# Patient Record
Sex: MALE | Race: WHITE | Employment: STUDENT | URBAN - METROPOLITAN AREA
[De-identification: names, ages, dates, MRNs, and addresses within clinical notes are randomized per-mention and may not be internally consistent; named-entity substitution may affect disease eponyms.]

---
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
## 2024-04-29 ENCOUNTER — HOSPITAL ENCOUNTER (OUTPATIENT)
Age: 19
Discharge: HOME | End: 2024-04-29
Payer: COMMERCIAL

## 2024-04-29 ENCOUNTER — APPOINTMENT (OUTPATIENT)
Dept: GENERAL RADIOLOGY | Age: 19
End: 2024-04-29
Attending: PHYSICIAN ASSISTANT
Payer: COMMERCIAL

## 2024-04-29 VITALS
DIASTOLIC BLOOD PRESSURE: 64 MMHG | HEART RATE: 63 BPM | TEMPERATURE: 98 F | OXYGEN SATURATION: 96 % | RESPIRATION RATE: 18 BRPM | SYSTOLIC BLOOD PRESSURE: 116 MMHG

## 2024-04-29 DIAGNOSIS — S93.401A SPRAIN OF RIGHT ANKLE, UNSPECIFIED LIGAMENT, INITIAL ENCOUNTER: ICD-10-CM

## 2024-04-29 DIAGNOSIS — S99.911A INJURY OF RIGHT ANKLE, INITIAL ENCOUNTER: Primary | ICD-10-CM

## 2024-04-29 PROCEDURE — 99203 OFFICE O/P NEW LOW 30 MIN: CPT | Performed by: PHYSICIAN ASSISTANT

## 2024-04-29 PROCEDURE — 73630 X-RAY EXAM OF FOOT: CPT | Performed by: PHYSICIAN ASSISTANT

## 2024-04-29 PROCEDURE — 73610 X-RAY EXAM OF ANKLE: CPT | Performed by: PHYSICIAN ASSISTANT

## 2024-04-29 RX ORDER — ARM BRACE
EACH MISCELLANEOUS
Qty: 1 EACH | Refills: 0 | Status: SHIPPED | OUTPATIENT
Start: 2024-04-29

## 2024-04-29 NOTE — ED INITIAL ASSESSMENT (HPI)
Pt was   in the basement when a monitor dropped on his rt foot an ankle, and then he nay have re injured it while running track

## 2024-04-29 NOTE — ED PROVIDER NOTES
Patient Seen in: [facility]      History   No chief complaint on file.    Stated Complaint: Right ankle injury    Subjective:   HPI  Khai Mcfarland is 19 year old male presents with acute right foot/ ankle pain x 1 day due to blunt injury.  Patient reports pain localized to dorsum of right foot/ ankle.  Non radiating, worsened w/ ambulation, weight bearing, and palpation.  No numbness, tingling, parasthesias, skin/ color/ temperature/ sensory changes reported.  No medications taken prior to arrival.             Objective:   Past Medical History:    ASD (atrial septal defect) ([facility])    resolved     Wise dischared    Asthma (HCC)    Down's syndrome (HCC)    Ear drum perforation    left   [facility]  at [facility]    Hypothyroid    Garcia   Luries    Pneumothorax    Left side   admit at [facility]     Ped Doreen Bingham              Past Surgical History:   Procedure Laterality Date    Adenoidectomy  2008    Luries    Create eardrum opening,gen anesth      Tonsillectomy                  Social History     Socioeconomic History    Marital status: Single   Tobacco Use    Smoking status: Never    Smokeless tobacco: Never   Vaping Use    Vaping status: Never Used   Substance and Sexual Activity    Alcohol use: No    Drug use: No              Review of Systems   All other systems reviewed and are negative.      Positive for stated complaint: Right ankle injury  Other systems are as noted in HPI.  Constitutional and vital signs reviewed.      All other systems reviewed and negative except as noted above.    Physical Exam     ED Triage Vitals [04/29/24 1115]   /64   Pulse 63   Resp 18   Temp 98.1 °F (36.7 °C)   Temp src Temporal   SpO2 96 %   O2 Device        Current:/64   Pulse 63   Temp 98.1 °F (36.7 °C) (Temporal)   Resp 18   SpO2 96%         Physical Exam  Vitals and nursing note reviewed.   Constitutional:       General: He is not in acute distress.     Appearance: Normal appearance. He is normal  weight. He is not ill-appearing, toxic-appearing or diaphoretic.   HENT:      Head: Normocephalic and atraumatic.      Nose: Nose normal.   Eyes:      Extraocular Movements: Extraocular movements intact.      Pupils: Pupils are equal, round, and reactive to light.   Cardiovascular:      Rate and Rhythm: Normal rate.      Pulses: Normal pulses.   Pulmonary:      Effort: Pulmonary effort is normal.      Breath sounds: Normal breath sounds.   Musculoskeletal:         General: Swelling and tenderness present. No deformity or signs of injury. Normal range of motion.      Cervical back: Normal range of motion and neck supple.      Right lower leg: No edema.      Left lower leg: No edema.      Comments: Soft tissue swelling to dorsum of right foot otherwise capillary refill less than 2 seconds, DP/ PT pulses intact 2+ bilaterally      Skin:     General: Skin is warm and dry.      Capillary Refill: Capillary refill takes less than 2 seconds.      Coloration: Skin is not jaundiced or pale.      Findings: No bruising, erythema, lesion or rash.   Neurological:      General: No focal deficit present.      Mental Status: He is alert and oriented to person, place, and time. Mental status is at baseline.   Psychiatric:         Mood and Affect: Mood normal.         Behavior: Behavior normal.         Thought Content: Thought content normal.         Judgment: Judgment normal.               ED Course   Labs Reviewed - No data to display  XR FOOT, COMPLETE (MIN 3 VIEWS), RIGHT (CPT=73630)   Final Result   PROCEDURE:  XR FOOT, COMPLETE (MIN 3 VIEWS), RIGHT (CPT=73630)       TECHNIQUE:  AP, oblique, and lateral views were obtained.       COMPARISON:  None.       INDICATIONS:  Right foot pain       PATIENT STATED HISTORY: (As transcribed by Technologist)  Right ankle and    Proximal foot pain. Patient accidentally dropped a computer monitor on his    foot on 04/13.        FINDINGS:  No evidence of acute displaced fracture or dislocation.      Developmental foreshortening of the 4th metatarsal noted.  Mild soft    tissue swelling noted along the ankle.  Normal mineralization.                           =====   CONCLUSION:  No evidence of acute displaced fracture or dislocation in the    right foot.       LOCATION:  [id number]           Dictated by (CST): Darryl Mckeon MD on 4/29/2024 at 11:52 AM        Finalized by (CST): Darryl Mckeon MD on 4/29/2024 at 11:53 AM         XR ANKLE (MIN 3 VIEWS), RIGHT (CPT=73610)   Final Result   PROCEDURE:  XR ANKLE (MIN 3 VIEWS), RIGHT (CPT=73610)       TECHNIQUE:  Three views were obtained.       COMPARISON:  [facility], XR, XR FOOT, COMPLETE (MIN 3 VIEWS), RIGHT    (CPT=73630), 4/29/2024, 11:38 AM.       INDICATIONS:  Right ankle pain       PATIENT STATED HISTORY: (As transcribed by Technologist)  Right ankle and    proximal foot pain. Patient accidentally dropped a computer monitor on his    foot on 04/13.        FINDINGS:  No evidence of acute displaced fracture or dislocation.  Normal    mineralization.  Mild to moderate anterolateral soft tissue swelling.     Ankle mortise intact.                         =====   CONCLUSION:  No evidence of acute displaced fracture or dislocation in the    right ankle.  Soft tissue swelling.       LOCATION:  [id number]           Dictated by (CST): Darryl Mckeon MD on 4/29/2024 at 11:51 AM        Finalized by (CST): Darryl Mckeon MD on 4/29/2024 at 11:52 AM           Vitals:    04/29/24 1115   BP: 116/64   Pulse: 63   Resp: 18   Temp: 98.1 °F (36.7 °C)     Medications - No data to display                   MDM                                        Medical Decision Making  19 year old male presents with right lower extremity sprain due to blunt injury sustained over 2 weeks prior with aggravation less than 1 week ago while running track.  Considerations include fracture vs dislocation vs achilles tendon rupture .    Plan  - x ray: right foot/ right ankle  - rx: CAM walking  boot/ Crutches  - OTC: Tylenol 1g po q 6 hours/ prn. ibuprofen 600mg po q8 hours/ prn.   - RICE Apply a compressive ACE bandage. Rest and elevate the affected painful area. Apply cold compresses intermittently as needed. As pain recedes, begin normal activities slowly as tolerated. Call if symptoms persist.  - explained to patient that if symptoms do not improve that an MRI may be warranted however that will be determined at the discretion of follow up care  - refer to orthopaedics/ PCP  as needed     Amount and/or Complexity of Data Reviewed  Radiology: ordered and independent interpretation performed. Decision-making details documented in ED Course.     Details: No fracture/ dislocation note of right ankle/ foot based on my independent interpretation         Disposition and Plan     Clinical Impression:  1. Injury of right ankle, initial encounter    2. Sprain of right ankle, unspecified ligament, initial encounter         Disposition:  Discharge  4/29/2024 12:16 pm    Follow-up:  Annetta Foley MD  [facility] [place] [id number]  [phone]                Medications Prescribed:  Current Discharge Medication List        START taking these medications    Details   Elastic Bandages & Supports (ACE ANKLE BRACE) Does not apply Misc Please provide patient with a CAM/walking boot.  Qty: 1 each, Refills: 0      Misc. Devices (CRUTCH SET) Does not apply Misc Please provide patient with a set of crutches.  Qty: 1 each, Refills: 0

## 2024-06-08 ENCOUNTER — LAB ENCOUNTER (OUTPATIENT)
Dept: LAB | Facility: HOSPITAL | Age: 19
End: 2024-06-08
Payer: COMMERCIAL

## 2024-06-08 DIAGNOSIS — E03.1 CONGENITAL HYPOTHYROIDISM: Primary | ICD-10-CM

## 2024-06-08 DIAGNOSIS — Z13.220 SCREENING FOR LIPOID DISORDERS: ICD-10-CM

## 2024-06-08 LAB
CHOLEST SERPL-MCNC: 193 MG/DL (ref ?–200)
FASTING PATIENT LIPID ANSWER: YES
HDLC SERPL-MCNC: 39 MG/DL (ref 40–59)
HGB BLD-MCNC: 15.7 G/DL
LDLC SERPL CALC-MCNC: 128 MG/DL (ref ?–100)
NONHDLC SERPL-MCNC: 154 MG/DL (ref ?–130)
T4 FREE SERPL-MCNC: 1.2 NG/DL (ref 0.8–1.7)
TRIGL SERPL-MCNC: 147 MG/DL (ref 30–149)
TSI SER-ACNC: 0.03 MIU/ML (ref 0.36–3.74)
VLDLC SERPL CALC-MCNC: 26 MG/DL (ref 0–30)

## 2024-06-08 PROCEDURE — 85018 HEMOGLOBIN: CPT

## 2024-06-08 PROCEDURE — 84443 ASSAY THYROID STIM HORMONE: CPT

## 2024-06-08 PROCEDURE — 80061 LIPID PANEL: CPT

## 2024-06-08 PROCEDURE — 84439 ASSAY OF FREE THYROXINE: CPT

## 2024-06-08 PROCEDURE — 36415 COLL VENOUS BLD VENIPUNCTURE: CPT

## 2024-10-28 ENCOUNTER — APPOINTMENT (OUTPATIENT)
Dept: ENDOCRINOLOGY | Age: 19
End: 2024-10-28

## 2024-10-28 ENCOUNTER — LAB SERVICES (OUTPATIENT)
Dept: ENDOCRINOLOGY | Age: 19
End: 2024-10-28

## 2024-10-28 VITALS
SYSTOLIC BLOOD PRESSURE: 131 MMHG | TEMPERATURE: 98.1 F | DIASTOLIC BLOOD PRESSURE: 64 MMHG | HEART RATE: 84 BPM | OXYGEN SATURATION: 95 % | WEIGHT: 176.37 LBS

## 2024-10-28 DIAGNOSIS — E03.9 ACQUIRED HYPOTHYROIDISM: Primary | ICD-10-CM

## 2024-10-28 DIAGNOSIS — E03.9 ACQUIRED HYPOTHYROIDISM: ICD-10-CM

## 2024-10-28 DIAGNOSIS — Q90.9 DOWN SYNDROME (CMD): ICD-10-CM

## 2024-10-28 LAB
T4 FREE SERPL-MCNC: 1.2 NG/DL (ref 0.8–1.5)
TSH SERPL-ACNC: 0.89 MCUNITS/ML (ref 0.35–5)

## 2024-10-28 PROCEDURE — 84443 ASSAY THYROID STIM HORMONE: CPT | Performed by: CLINICAL MEDICAL LABORATORY

## 2024-10-28 PROCEDURE — 99204 OFFICE O/P NEW MOD 45 MIN: CPT | Performed by: INTERNAL MEDICINE

## 2024-10-28 PROCEDURE — 84439 ASSAY OF FREE THYROXINE: CPT | Performed by: CLINICAL MEDICAL LABORATORY

## 2024-10-28 PROCEDURE — 36415 COLL VENOUS BLD VENIPUNCTURE: CPT | Performed by: INTERNAL MEDICINE

## 2024-10-28 PROCEDURE — G2211 COMPLEX E/M VISIT ADD ON: HCPCS | Performed by: INTERNAL MEDICINE

## 2024-10-28 RX ORDER — AZELASTINE 1 MG/ML
SPRAY, METERED NASAL
COMMUNITY
Start: 2024-09-25

## 2024-10-28 RX ORDER — BUDESONIDE 0.25 MG/2ML
250 INHALANT ORAL
COMMUNITY

## 2024-10-28 RX ORDER — LEVOTHYROXINE SODIUM 125 UG/1
125 TABLET ORAL
COMMUNITY
Start: 2024-05-28

## 2024-10-28 RX ORDER — ALBUTEROL SULFATE 0.83 MG/ML
2.5 SOLUTION RESPIRATORY (INHALATION)
COMMUNITY

## 2024-11-01 ENCOUNTER — TELEPHONE (OUTPATIENT)
Dept: ENDOCRINOLOGY | Age: 19
End: 2024-11-01